# Patient Record
Sex: MALE | Race: WHITE
[De-identification: names, ages, dates, MRNs, and addresses within clinical notes are randomized per-mention and may not be internally consistent; named-entity substitution may affect disease eponyms.]

---

## 2017-11-06 NOTE — CT
EXAMINATION TYPE: CT urogram wo/w con

 

DATE OF EXAM: 11/6/2017

 

COMPARISON: 6/29/2015

 

HISTORY: Microscopic hematuria

 

CT DLP: 2401 mGycm, Automated Exposure Control for Dose Reduction was Utilized.

 

CONTRAST: 

CT scan of the abdomen and pelvis is performed with oral and without and with IV Contrast, patient in
jected with 100 mL of Omnipaque 300.

 

FINDINGS:

 

LUNG BASES: Focal pleural thickening measuring 5 mm versus subpleural pulmonary nodule is seen along 
the anterior right lower lobe.

 

LIVER/GB:   No significant abnormality is appreciated. No radiopaque calculi within the gallbladder.

 

PANCREAS:  No significant abnormality is seen.

 

SPLEEN:  No significant abnormality is seen.

 

ADRENALS:  No significant abnormality is seen.

 

KIDNEYS: 7 mm calculus of the posterior right inferior pole is seen within a distal calyx, this is un
changed from the prior exam. Left lower pole renal sinus cyst is also seen measuring 1.9 cm. No evide
nce of obstructive uropathy. No evidence of nephrolithiasis.

 

Right upper pole probable cyst is 4 mm and too small to accurately characterize.

 

BOWEL:   No significant abnormality is seen.

 

PROSTATE/SEMINAL VESICLES: Heterogenous containing central zone calcifications and enlarged measuring
 approximately 5.4 cm in transverse dimension. Left fat filled inguinal hernias noted.

 

LYMPH NODES:  No greater than 1cm abdominal or pelvic lymph nodes are appreciated.

 

OSSEOUS STRUCTURES:  No significant abnormality is seen.

 

IMPRESSION:

1. Unchanged nonobstructing 7 mm right lower pole calculus within a peripheral minor calyx. No eviden
ce of obstructive uropathy bilaterally.

2. Heterogenous and enlarged prostate gland containing central zone calcifications.

3. Left 1.9 cm renal sinus cyst.

4. Probable right upper pole renal cyst although this is 4 mm and too small to accurately characteriz
e.

## 2018-02-05 NOTE — P.OP
Date of Procedure: 02/05/18


Preoperative Diagnosis: 


Screening colonoscopy


Postoperative Diagnosis: 


Mild diverticulosis


Procedure(s) Performed: 


Colonoscopy


Anesthesia: MAC


Surgeon: Felipe Kuhn


Pathology: none sent


Condition: stable


Disposition: PACU


Description of Procedure: 


The patient's placed on the endoscopy table in the lateral position.  She 

received IV sedation.  Digital rectal exam was performed which revealed no 

ebonized.  The prostate was symmetric without nodules.  The flexible 

colonoscope was then placed patient anus passed throughout the entire colon.  

The ileocecal valve was visually is.  The cecum, ascending and transverse colon 

appeared normal.  The descending and sigmoid had mild diverticulosis.  There is 

known to diverticulitis.  Scope was then brought back the rectum and this 

appeared normal.  Scope withdrawn for patient.

## 2018-02-05 NOTE — P.GSHP
History of Present Illness


H&P Date: 02/05/18


Chief Complaint: Screening colonoscopy





This is a 62-year-old male referred from Dr. Johnson.  Patient presents today 

for screening colonoscopy.  He denies a significant GI complaints.





Past Medical History


Past Medical History: CVA/TIA, GERD/Reflux, Hyperlipidemia, Hypertension


Additional Past Medical History / Comment(s): no deficits from CVA


History of Any Multi-Drug Resistant Organisms: None Reported


Additional Past Surgical History / Comment(s): dental implants


Past Anesthesia/Blood Transfusion Reactions: No Reported Reaction


Smoking Status: Never smoker





- Past Family History


  ** Brother(s)


Family Medical History: Cancer


Additional Family Medical History / Comment(s): colon/bladder





Medications and Allergies


 Home Medications











 Medication  Instructions  Recorded  Confirmed  Type


 


Aspirin EC [Ecotrin] 325 mg PO DAILY 02/01/18 02/05/18 History


 


Hydrochlorothiazide 25 mg PO DAILY 02/01/18 02/05/18 History





[Hydrochlorothiazide]    


 


Ranitidine HCl [Zantac] 150 mg PO DAILY 02/01/18 02/05/18 History


 


Simvastatin [Simvastatin] 40 mg PO DAILY 02/01/18 02/05/18 History











 Allergies











Allergy/AdvReac Type Severity Reaction Status Date / Time


 


No Known Allergies Allergy   Verified 02/01/18 10:00














Surgical - Exam


 Vital Signs











Temp Pulse Resp BP


 


 98.8 F   74   16   140/85 


 


 02/05/18 08:13  02/05/18 08:13  02/05/18 08:13  02/05/18 08:13














- General


well developed, no distress





- Eyes


PERRL





- ENT


normal pinna





- Neck


no masses





- Respiratory


normal expansion





- Cardiovascular


Rhythm: regular





- Abdomen


Abdomen: soft, non tender





Assessment and Plan


Assessment: 





We'll perform screening colonoscopy.

## 2021-07-06 ENCOUNTER — HOSPITAL ENCOUNTER (OUTPATIENT)
Age: 66
Discharge: HOME | End: 2021-07-06
Payer: MEDICARE

## 2021-07-06 DIAGNOSIS — Z01.812: Primary | ICD-10-CM

## 2021-07-06 PROCEDURE — 86850 RBC ANTIBODY SCREEN: CPT

## 2021-07-06 PROCEDURE — 80053 COMPREHEN METABOLIC PANEL: CPT

## 2021-07-06 PROCEDURE — 87070 CULTURE OTHR SPECIMN AEROBIC: CPT

## 2021-07-06 PROCEDURE — 85025 COMPLETE CBC W/AUTO DIFF WBC: CPT

## 2021-07-06 PROCEDURE — 81001 URINALYSIS AUTO W/SCOPE: CPT

## 2021-07-06 PROCEDURE — 86901 BLOOD TYPING SEROLOGIC RH(D): CPT

## 2021-07-06 PROCEDURE — 85730 THROMBOPLASTIN TIME PARTIAL: CPT

## 2021-07-06 PROCEDURE — 85610 PROTHROMBIN TIME: CPT

## 2021-07-06 PROCEDURE — 86900 BLOOD TYPING SEROLOGIC ABO: CPT

## 2021-07-06 PROCEDURE — 36415 COLL VENOUS BLD VENIPUNCTURE: CPT

## 2021-07-13 ENCOUNTER — HOSPITAL ENCOUNTER (OUTPATIENT)
Dept: HOSPITAL 47 - OR | Age: 66
LOS: 1 days | Discharge: HOME HEALTH | End: 2021-07-14
Attending: ORTHOPAEDIC SURGERY
Payer: MEDICARE

## 2021-07-13 VITALS — BODY MASS INDEX: 26.1 KG/M2

## 2021-07-13 DIAGNOSIS — M16.11: Primary | ICD-10-CM

## 2021-07-13 DIAGNOSIS — K21.9: ICD-10-CM

## 2021-07-13 DIAGNOSIS — I10: ICD-10-CM

## 2021-07-13 DIAGNOSIS — Z79.899: ICD-10-CM

## 2021-07-13 DIAGNOSIS — I69.398: ICD-10-CM

## 2021-07-13 DIAGNOSIS — E78.5: ICD-10-CM

## 2021-07-13 DIAGNOSIS — Z79.82: ICD-10-CM

## 2021-07-13 DIAGNOSIS — Z79.1: ICD-10-CM

## 2021-07-13 PROCEDURE — 85025 COMPLETE CBC W/AUTO DIFF WBC: CPT

## 2021-07-13 PROCEDURE — 97116 GAIT TRAINING THERAPY: CPT

## 2021-07-13 PROCEDURE — 97110 THERAPEUTIC EXERCISES: CPT

## 2021-07-13 PROCEDURE — 97165 OT EVAL LOW COMPLEX 30 MIN: CPT

## 2021-07-13 PROCEDURE — 86891 AUTOLOGOUS BLOOD OP SALVAGE: CPT

## 2021-07-13 PROCEDURE — 27130 TOTAL HIP ARTHROPLASTY: CPT

## 2021-07-13 PROCEDURE — 86901 BLOOD TYPING SEROLOGIC RH(D): CPT

## 2021-07-13 PROCEDURE — 86850 RBC ANTIBODY SCREEN: CPT

## 2021-07-13 PROCEDURE — 73501 X-RAY EXAM HIP UNI 1 VIEW: CPT

## 2021-07-13 PROCEDURE — 86900 BLOOD TYPING SEROLOGIC ABO: CPT

## 2021-07-13 PROCEDURE — 97161 PT EVAL LOW COMPLEX 20 MIN: CPT

## 2021-07-13 PROCEDURE — 88300 SURGICAL PATH GROSS: CPT

## 2021-07-13 RX ADMIN — Medication PRN ML: at 09:36

## 2021-07-13 RX ADMIN — HYDROMORPHONE HYDROCHLORIDE PRN MG: 1 INJECTION, SOLUTION INTRAMUSCULAR; INTRAVENOUS; SUBCUTANEOUS at 11:23

## 2021-07-13 RX ADMIN — DEXAMETHASONE SODIUM PHOSPHATE ONE: 4 INJECTION, SOLUTION INTRAMUSCULAR; INTRAVENOUS at 16:28

## 2021-07-13 RX ADMIN — CEFAZOLIN SCH: 330 INJECTION, POWDER, FOR SOLUTION INTRAMUSCULAR; INTRAVENOUS at 16:28

## 2021-07-13 RX ADMIN — ASPIRIN 325 MG ORAL TABLET SCH MG: 325 PILL ORAL at 19:19

## 2021-07-13 RX ADMIN — POTASSIUM CHLORIDE SCH MLS: 14.9 INJECTION, SOLUTION INTRAVENOUS at 09:05

## 2021-07-13 RX ADMIN — HYDROMORPHONE HYDROCHLORIDE PRN MG: 1 INJECTION, SOLUTION INTRAMUSCULAR; INTRAVENOUS; SUBCUTANEOUS at 10:56

## 2021-07-13 RX ADMIN — HYDROCODONE BITARTRATE AND ACETAMINOPHEN PRN EACH: 7.5; 325 TABLET ORAL at 19:19

## 2021-07-13 RX ADMIN — ONDANSETRON ONE MG: 2 INJECTION INTRAMUSCULAR; INTRAVENOUS at 08:56

## 2021-07-13 RX ADMIN — HYDROMORPHONE HYDROCHLORIDE PRN MG: 1 INJECTION, SOLUTION INTRAMUSCULAR; INTRAVENOUS; SUBCUTANEOUS at 11:00

## 2021-07-13 RX ADMIN — CEFAZOLIN SCH MLS/HR: 330 INJECTION, POWDER, FOR SOLUTION INTRAMUSCULAR; INTRAVENOUS at 23:30

## 2021-07-13 RX ADMIN — DEXAMETHASONE SODIUM PHOSPHATE ONE MG: 4 INJECTION, SOLUTION INTRAMUSCULAR; INTRAVENOUS at 08:56

## 2021-07-13 RX ADMIN — Medication PRN ML: at 10:20

## 2021-07-13 RX ADMIN — POTASSIUM CHLORIDE SCH MLS: 14.9 INJECTION, SOLUTION INTRAVENOUS at 08:45

## 2021-07-13 RX ADMIN — ONDANSETRON ONE: 2 INJECTION INTRAMUSCULAR; INTRAVENOUS at 16:28

## 2021-07-13 RX ADMIN — HYDROMORPHONE HYDROCHLORIDE PRN MG: 1 INJECTION, SOLUTION INTRAMUSCULAR; INTRAVENOUS; SUBCUTANEOUS at 11:20

## 2021-07-13 NOTE — P.OP
Date of Procedure: 07/13/21


Preoperative Diagnosis: 


severe osteoarthritis of the right hip


Postoperative Diagnosis: 


severe osteoarthritis of the right hip


Procedure(s) Performed: 


right total hip arthroplasty with a direct anterior approach


Implants: 


[employer] Polarstem standard size 6


[employer] R3, 3 hole hemispherical acetabular shell, 52 mm


[employer] Reflection 6.5 mm cancellus screw, 20 mm 2


[employer] R3, XLPE 20 acetabular liner 


[employer]hew Oxinium femoral head 36 m, +0


All components were press-fit.


The articulation is Oxinium on polyethylene.


Anesthesia: GETA


Surgeon: Keshawn Ng


Assistant #1: Juli Bustamante


Estimated Blood Loss (ml): 400 (141 mL returned with Cell Saver)


Pathology: other (femoral head)


Condition: stable


Disposition: PACU


Indications for Procedure: 


After failure of conservative treatment we discussed the surgical and 

nonsurgical treatment options at length.  Patient wishes to proceed with a total

hip arthroplasty with a direct anterior approach.  Complications specific to 

this procedure were discussed at length, including but not limited to infection,

leg length discrepancy, dislocation, nerve injury, and fracture.  Covid-19 was 

also discussed at length with the patient, and they are aware of the current 

policies and procedures.  The patient was given the option of delaying surgery, 

but they elect to proceed knowing these risks.  Patient is aware of all these 

complications and informed consent was obtained


Operative Findings: 


the operative findings are consistent with severe osteoarthritis of the right 

hip


Description of Procedure: 


Patient was seen and evaluated in the preoperative area and the consent was 

reviewed.  The operative site was marked with a skin marker.  The patient was 

then brought to the operating room and given preoperative antibiotics 

intravenously.  1 g of Tranexamic acid was also given intravenously.  A general 

anesthetic was administered by the anesthesia department.   The patient was then

placed on the [place] table with the bony prominences well-padded.  The hip area 

was then prepped with a ChloraPrep solution and draped in the usual sterile 

fashion.





A universal timeout was then performed, which confirmed the patient's name, 

surgical site, ALLERGIES, and procedure being performed on the consent.  Next 

the incision site was located at 1 cm distal to the anterior superior iliac 

spine along the flexion crease of the hip.  The skin and subcutaneous tissues 

were sharply incised.  Incision was carefully dissected down to the fascia 

overlying the tensor fascia anayeli muscle.  This fascia was then incised in line 

with the incision.  Care was taken to stay laterally in order to avoid injuring 

the lateral femoral cutaneous nerve.  Next, using blunt finger dissection, the 

tensor fascia anayeli muscle was dissected off its investing fascia.  The muscle 

was then carefully retracted laterally with a cobra retractor over the lateral 

neck of the femur.  Next, the circumflex vessels were identified and cauterized 

using the AquaMantis device.  The anterior hip capsule was then exposed.  The 

capsule was then opened and an inverted T fashion.  Cobra retractors were then 

placed intracapsularly.  The retractors were maintained intracapsular throughout

the procedure.  The proximal femur was then visualized.  A small amount of 

traction was placed on the leg.





The femoral neck was then osteotomized appropriate level above the lesser 

trochanter.  A small wedge of bone was then removed from the remaining femoral 

head.  Next, using a corkscrew the femoral head was removed from the acetabulum.

 On gross visual inspection, the femoral head had complete loss of articular 

cartilage and multiple periarticular osteophytes.  The femoral head was then 

measured.  Attention was then turned to the acetabulum.





The acetabulum was exposed and any remaining labrum was excised.  Sequential 

reaming of the acetabulum was performed using fluoroscopic guidance until there 

was a good bed of bleeding cancellus bone.  When the appropriate size was 

reached, a trial was then placed.  The position and fit of the trial was checked

with fluoroscopy.  The trial was then removed.  Then, using fluoroscopic 

guidance, the final implant was impacted at 20 of anteversion and 40 of 

abduction, and fully seated in the acetabulum.  2 screws were then placed in the

acetabulum.  Again fluoroscopy was used to check position of the screws.  Next, 

the liner was then impacted, with a 20 elevated liner located in the anterior 

superior quadrant.  Component locking was confirmed.





Attention was then directed to the femur.  With the aid of the [place] table, the 

femur was externally rotated to approximately 130, extended, and adducted under

the opposite leg.  A side hook was then placed under the proximal femur, and the

side hook elevator was used to elevate the proximal femur while releasing the 

capsule.  Retractors were then placed.  A capsular release was performed, as 

well as a release of the conjoined tendon, which afforded excellent 

visualization of the proximal femur.  Next, a box osteotome was used to 

lateralize the proximal femur.  A  was then used to locate the 

femoral canal.  Sequential broaching was then performed with appropriate size 

which afforded excellent fixation in the proximal femur.  A trial was then 

placed with appropriate head and neck, and the hip was gently reduced with the 

aid of the [place] table.  Fluoroscopy was then used to check position of the 

components, as well as to ensure equal leg lengths.  The hip was then gently 

dislocated and the trials were then removed.  Final implants were then impacted 

and the hip was again reduced.  Final fluoroscopic x-rays confirmed that the 

components were in anatomic position, as well as equal leg lengths.  The hip was

also taken through range of motion, and found to be stable.





The hip was then copiously irrigated with antibiotic solution with pulsatile 

lavage.  The hip was then irrigated with Irrisept solution.  The soft tissues 

were then injected with a ropivacaine solution, which consisted of 246.25 mg of 

ropivacaine, 0.5 mg of epinephrine, 30 mg of Toradol, 80 g of clonidine, and 

48.45 mL of sterile water, for a total of 100 mL of fluid injected.  A second 

dose of 1 g of Tranexamic acid was also given intravenously.  Any blood 

collected by Cell Saver was then returned to the patient at this time.





The fascia was then closed with 2-0 strata fix suture.  The subcutaneous tissue 

was closed with 3-0 Vicryl.  The subcuticular tissue was closed with 3-0 strata 

fix suture.  The skin was then closed with Exofin skin glue.  After the glue and

dried, and Optifoam silver impregnated dressing was applied.  The patient was 

then transferred to the recovery room in stable condition.





The assistant  REYES Lipscomb was required due to the complexity of surgery, 

and the need for skilled surgical assistant for positioning, draping, exposure, 

retraction, and closure of the wound.

## 2021-07-13 NOTE — P.CONS
History of Present Illness





- Reason for Consult


Consult date: 07/13/21





- History of Present Illness





History of Presenting Illness:





Patient is a 65-year-old male with a past medical history of hypertension, 

hyperlipidemia, CVA with left-sided deficit with decreased sensation and 

osteoarthritis.  He is currently admitted under orthopedic surgery team status 

post elective right total hip replacement due to advanced osteoarthritis.  We 

have been consulted to follow along with this patient throughout his 

hospitalization for continued medical management.  Patient is currently 

postoperative period and reports having postoperative nausea and vomiting.  

Scopolamine patch in place.  Patient given Zofran.  Continue hydrated with 

gentle hydration with IV fluids and we will advance diet as patient tolerates.  

Patient currently denies having any headache, lightheadedness, dizziness, chest 

pain, palpitations, shortness of breath, or experiencing any new or worsened 

numbness/tingling/weakness in extremities.  Patient does have left-sided 

decreased sensation from previous CVA in 2013 in which patient takes aspirin and

atorvastatin daily.  Patient denies history of DVT or PE.  Reports postoperative

pain manageable at this time.





Review of systems:





Pertinent positives and negatives as discussed in HPI, a complete review of 

systems was performed and all other systems are negative.





Physical exam:





General: non toxic, no distress, appears at stated age


Derm: warm, dry


Head: atraumatic, normocephalic, symmetric


Eyes: EOMI, no lid lag, anicteric sclera


Mouth: no lip lesion, mucus membranes moist


Cardiovascular: S1-S2 normal with regular rate and rhythm.  No murmurs, gallops,

or rubs noted.  Posterior tibial pulses palpated bilaterally.  Cap refill less 

than 2 seconds.


Lungs: Respirations even, regular, and unlabored on room air.  Lungs clear to 

auscultation bilaterally with no wheezes, rhonchi, or rales noted.  No accessory

muscle usage.


Abdominal: soft, nontender to palpation, no guarding, no appreciable 

organomegaly


Ext: no gross muscle atrophy, no edema, no contractures


Neuro: GCS 15.  Speech clear.  CN II-XI grossly intact, no focal neuro deficits


Psych: Alert, oriented, appropriate affect 





Assessment and Plan of Care:





Status post right hip replacement secondary to advanced osteoarthritis


-Pain management, DVT prophylaxis, weightbearing, PT/OT, and dressing changes 

per primary admitting orthopedic surgery team.


-DVT prophylaxis currently with aspirin, TAL hose, and SCDs





Postoperative nausea and vomiting


-Zofran as needed for nausea and vomiting


-Gentle hydration with IV fluids and advance diet as patient tolerates.





Hypertension


-Monitor vital signs and continue daily medication regimen with 

hydrochlorothiazide.





Hyperlipidemia


-Continue daily medication regimen with atorvastatin.


-Heart healthy diet.





History with left-sided residual deficit of decreased sensation


-Safe and supportive care with assistance as needed


-Fall precautions.


-Continue aspirin and atorvastatin.





Thank you for allowing us to participate in the care of this pleasant patient.  

Do not hesitate to contact us with questions.  Someone can be reached from the 

[facility] hospitalist group all hours of the day at [phone] or via 

perfect serve.











Past Medical History


Past Medical History: CVA/TIA, GERD/Reflux, Hyperlipidemia, Hypertension


Additional Past Medical History / Comment(s): no deficits from CVA


History of Any Multi-Drug Resistant Organisms: None Reported


Past Surgical History: Orthopedic Surgery


Additional Past Surgical History / Comment(s): dental implants, wisdom teeth, 

colonoscopy


Past Anesthesia/Blood Transfusion Reactions: Postoperative Nausea & Vomiting 

(PONV)


Past Psychological History: No Psychological Hx Reported


Smoking Status: Never smoker


Past Alcohol Use History: None Reported


Past Drug Use History: None Reported





- Past Family History


  ** Brother(s)


Family Medical History: Cancer


Additional Family Medical History / Comment(s): colon/bladder





Medications and Allergies


                                Home Medications











 Medication  Instructions  Recorded  Confirmed  Type


 


Aspirin EC [Ecotrin] 325 mg PO DAILY 02/01/18 07/13/21 History


 


Simvastatin 40 mg PO HS 02/01/18 07/13/21 History


 


hydroCHLOROthiazide 25 mg PO HS 02/01/18 07/13/21 History





[Hydrochlorothiazide]    


 


Acetaminophen Tab [Tylenol] 650 mg PO Q6H PRN 07/09/21 07/13/21 History


 


Ibuprofen 200 - 600 mg PO Q6H PRN 07/09/21 07/13/21 History


 


Aspirin 325 mg PO BID #60 tab 07/13/21  Rx


 


Celecoxib [CeleBREX] 200 mg PO DAILY 5 Days #5 capsule 07/13/21  Rx


 


HYDROcodone/APAP 7.5-325MG [Norco 1 - 2 tab PO Q6H PRN #32 tab 07/13/21  Rx





7.5-325]    


 


Ondansetron Odt [Zofran Odt] 1 tab PO Q8HR PRN #10 tab 07/13/21  Rx


 


Sennosides [Senokot] 2 tab PO DAILY PRN #60 tablet 07/13/21  Rx








                                    Allergies











Allergy/AdvReac Type Severity Reaction Status Date / Time


 


No Known Allergies Allergy   Verified 07/13/21 08:36














Physical Exam


Vitals: 


                                   Vital Signs











  Temp Pulse Pulse Pulse Resp BP BP


 


 07/13/21 15:50  97.5 F L   58 L   18   132/77


 


 07/13/21 15:42    60   20   144/80


 


 07/13/21 15:08    67   20   126/79


 


 07/13/21 14:59    71   20   121/78


 


 07/13/21 14:41    68   18   114/76


 


 07/13/21 14:30    75   20   123/72


 


 07/13/21 14:00    56 L   18  132/85 


 


 07/13/21 13:30    58 L   20  134/87 


 


 07/13/21 13:00    58 L   20  134/70 


 


 07/13/21 12:45    57 L   18  140/84 


 


 07/13/21 12:30    58 L   20  


 


 07/13/21 12:19    61   20  133/83 


 


 07/13/21 11:54    67   18  151/87 


 


 07/13/21 11:33    78   20  156/78 


 


 07/13/21 11:16   57 L    16  151/82 


 


 07/13/21 11:02   58 L    16  151/85 


 


 07/13/21 10:48   56 L    16  170/93 


 


 07/13/21 10:37  97.0 F L  64    12  171/89 


 


 07/13/21 08:45  98.4 F    87  18  141/83 














  Pulse Ox


 


 07/13/21 15:50  96


 


 07/13/21 15:42  97


 


 07/13/21 15:08  97


 


 07/13/21 14:59  98


 


 07/13/21 14:41  96


 


 07/13/21 14:30  95


 


 07/13/21 14:00  97


 


 07/13/21 13:30  97


 


 07/13/21 13:00  97


 


 07/13/21 12:45  97


 


 07/13/21 12:30 


 


 07/13/21 12:19  97


 


 07/13/21 11:54  95


 


 07/13/21 11:33  98


 


 07/13/21 11:16  94 L


 


 07/13/21 11:02  100


 


 07/13/21 10:48  100


 


 07/13/21 10:37  97


 


 07/13/21 08:45  99








                                Intake and Output











 07/13/21 07/13/21 07/13/21





 06:59 14:59 22:59


 


Intake Total  2076 


 


Output Total  400 


 


Balance  1676 


 


Intake:   


 


  IV  2076 


 


Output:   


 


  Estimated Blood Loss  400 


 


Other:   


 


  Weight  79.4 kg 79.4 kg

## 2021-07-13 NOTE — FL
Fluoroscopy

 

HISTORY: Hip arthroplasty

 

16 seconds fluoroscopy time supplied to the referring clinician.  2 intraoperative C-arm images docum
ent the procedure. See dictated report from orthopedic surgery.

## 2021-07-13 NOTE — XR
EXAMINATION TYPE: XR Hip Limited RT

 

DATE OF EXAM: 7/13/2021

 

CLINICAL HISTORY: Right hip pain and osteoarthritis.

 

TECHNIQUE: Single AP portable view of right hip is obtained immediately postoperatively.  

 

COMPARISON: CT 6/29/2015

 

FINDINGS: Metallic hardware from right hip arthroplasty is seen and appears satisfactory in alignment
 and position.  There is evidence of recent surgery with subcutaneous gas noted laterally.  

 

IMPRESSION: Metallic hardware from right hip arthroplasty is satisfactory in position.

## 2021-07-13 NOTE — XR
Single view right hip paper image

 

INDICATION: Postoperative right hip

 

COMPARISON: 6/29/2015

 

FINDINGS:

Patient is status post right hip replacement with 2 acetabular screws. For full details please see th
e operative report.

 

IMPRESSION:

Patient is status post right hip replacement with 2 acetabular screws. For full details please see th
e operative report.

## 2021-07-14 VITALS — TEMPERATURE: 98.9 F

## 2021-07-14 VITALS — HEART RATE: 74 BPM | SYSTOLIC BLOOD PRESSURE: 111 MMHG | DIASTOLIC BLOOD PRESSURE: 59 MMHG | RESPIRATION RATE: 17 BRPM

## 2021-07-14 LAB
BASOPHILS # BLD AUTO: 0 K/UL (ref 0–0.2)
BASOPHILS NFR BLD AUTO: 0 %
EOSINOPHIL # BLD AUTO: 0 K/UL (ref 0–0.7)
EOSINOPHIL NFR BLD AUTO: 0 %
ERYTHROCYTE [DISTWIDTH] IN BLOOD BY AUTOMATED COUNT: 3.62 M/UL (ref 4.3–5.9)
ERYTHROCYTE [DISTWIDTH] IN BLOOD: 12.4 % (ref 11.5–15.5)
HCT VFR BLD AUTO: 33.9 % (ref 39–53)
HGB BLD-MCNC: 11.6 GM/DL (ref 13–17.5)
LYMPHOCYTES # SPEC AUTO: 1.5 K/UL (ref 1–4.8)
LYMPHOCYTES NFR SPEC AUTO: 15 %
MCH RBC QN AUTO: 32.1 PG (ref 25–35)
MCHC RBC AUTO-ENTMCNC: 34.3 G/DL (ref 31–37)
MCV RBC AUTO: 93.7 FL (ref 80–100)
MONOCYTES # BLD AUTO: 0.7 K/UL (ref 0–1)
MONOCYTES NFR BLD AUTO: 8 %
NEUTROPHILS # BLD AUTO: 7.4 K/UL (ref 1.3–7.7)
NEUTROPHILS NFR BLD AUTO: 76 %
PLATELET # BLD AUTO: 135 K/UL (ref 150–450)
WBC # BLD AUTO: 9.7 K/UL (ref 3.8–10.6)

## 2021-07-14 RX ADMIN — HYDROCODONE BITARTRATE AND ACETAMINOPHEN PRN EACH: 7.5; 325 TABLET ORAL at 06:08

## 2021-07-14 RX ADMIN — ASPIRIN 325 MG ORAL TABLET SCH MG: 325 PILL ORAL at 08:33

## 2021-07-14 NOTE — P.DS
Providers


Date of admission: 


07/13/2021





Expected date of discharge: 07/14/21


Attending physician: 


Keshawn Ng





Consults: 





                                        





07/13/21 16:18


Consult Physician Routine 


   Consulting Provider: Jonathan Choe


   Consult Reason/Comments: medical management


   Do you want consulting provider notified?: Yes











Primary care physician: 


Sujit Johnson








- Discharge Diagnosis(es)


(1) Osteoarthritis of right hip


Current Visit: Yes   Status: Acute   





(2) Status post total hip replacement, right


Current Visit: Yes   Status: Acute   


Hospital Course: 


This is a 65-year-old male with known history of degenerative arthritis of the 

right hip.  The patient presents for evaluation.  After discussion and 

consideration patient elects to proceed with total  hip arthroplasty with direct

anterior approach.


The patient is seen preoperatively by his primary care physician and cleared for

surgery.





Patient is admitted to [facility] on 07/13/2021 for total hip 

arthroplasty with direct anterior approach.  The procedures performed without 

complication or sequelae.  The patient is doing well postoperatively.  Labs and 

vital signs are stable on day of discharge.





On day of discharge patient's hip incision is healing well.  There is minimal 

erythema.  There is no drainage noted at this time.  There is minimal soft 

tissue swelling to the hip and thigh.  Patient has full foot and ankle motion 

without difficulty or pain.  Neurovascular status to the right lower extremity 

is intact.





Patient is discharged to home in good condition.  Please see med rec for 

accurate list of home medications.





Patient Condition at Discharge: Good





Plan - Discharge Summary


Discharge Rx Participant: Yes


New Discharge Prescriptions: 


New


   Aspirin 325 mg PO BID #60 tab


   Celecoxib [CeleBREX] 200 mg PO DAILY 5 Days #5 capsule


   HYDROcodone/APAP 7.5-325MG [Norco 7.5-325] 1 - 2 tab PO Q6H PRN #32 tab


     PRN Reason: Pain


   Sennosides [Senokot] 2 tab PO DAILY PRN #60 tablet


     PRN Reason: Constipation


   Ondansetron Odt [Zofran Odt] 1 tab PO Q8HR PRN #10 tab


     PRN Reason: Nausea





No Action


   Aspirin EC [Ecotrin] 325 mg PO DAILY


   Simvastatin 40 mg PO HS


   hydroCHLOROthiazide [Hydrochlorothiazide] 25 mg PO HS


   Acetaminophen Tab [Tylenol] 650 mg PO Q6H PRN


     PRN Reason: Pain


   Ibuprofen 200 - 600 mg PO Q6H PRN


     PRN Reason: Pain


Discharge Medication List





Aspirin EC [Ecotrin] 325 mg PO DAILY 02/01/18 [History]


Simvastatin 40 mg PO HS 02/01/18 [History]


hydroCHLOROthiazide [Hydrochlorothiazide] 25 mg PO HS 02/01/18 [History]


Acetaminophen Tab [Tylenol] 650 mg PO Q6H PRN 07/09/21 [History]


Ibuprofen 200 - 600 mg PO Q6H PRN 07/09/21 [History]


Aspirin 325 mg PO BID #60 tab 07/13/21 [Rx]


Celecoxib [CeleBREX] 200 mg PO DAILY 5 Days #5 capsule 07/13/21 [Rx]


HYDROcodone/APAP 7.5-325MG [Norco 7.5-325] 1 - 2 tab PO Q6H PRN #32 tab 07/13/21

[Rx]


Ondansetron Odt [Zofran Odt] 1 tab PO Q8HR PRN #10 tab 07/13/21 [Rx]


Sennosides [Senokot] 2 tab PO DAILY PRN #60 tablet 07/13/21 [Rx]








Follow up Appointment(s)/Referral(s): 


[facility], [NON-STAFF] - 


Keshawn Ng DO [Doctor of Osteopathic Medicine] - 07/26/21 9:45 am


Patient Instructions/Handouts:  *[facility] - (Anesthesia) Discharge 

Instructions Outpatient Surgery, *[facility] - Scopalamine Patch Instructions, 

How to Use an Incentive Spirometer (DC), Total Hip Replacement (DC)


Activity/Diet/Wound Care/Special Instructions: 


Weightbearing as tolerated with walker.


Leave dressing intact.  Dressing may be removed by home care nurse or by patient

in 7 days. Then change dressing twice daily until follow up.


May shower with initial dressing intact and after removal.


If dressing become saturated, please remove.


Please take aspirin 325mg twice daily for 30 days to prevent blood clots.


Recommend use of compression stockings daily until follow up to help prevent 

swelling and blood clots. May remove at night before sleeping. 


Please follow-up with [facility] in 2 weeks and call with any 

questions or concerns, [phone].





Discharge Disposition: HOME WITH HOME HEALTH SERVICES

## 2021-07-14 NOTE — P.PN
Subjective


Progress Note Date: 07/14/21





Hospital course:





Patient is a 65-year-old male with a past medical history of hypertension, 

hyperlipidemia, CVA with left-sided deficit with decreased sensation and 

osteoarthritis.  He is currently admitted under orthopedic surgery team status 

post elective right total hip replacement due to advanced osteoarthritis.  We 

have been consulted to follow along with this patient throughout his 

hospitalization for continued medical management.  Patient is currently day 1 

postop and is doing well.  He has been ambulatory in room without difficulties. 

He states postoperative pain is managed and denies having any further episodes 

of nausea or vomiting.  Patient tolerating oral intake without any difficulties.

 He denies having any headache, lightheadedness, dizziness, chest pain, 

palpitations, shortness of breath, or experiencing any 

numbness/swelling/weakness in his extremities.





Physical exam:





General: non toxic, no distress, appears at stated age


Derm: warm, dry


Head: atraumatic, normocephalic, symmetric


Eyes: EOMI, no lid lag, anicteric sclera


Mouth: no lip lesion, mucus membranes moist


Cardiovascular: S1-S2 normal with regular rate and rhythm.  No murmurs, gallops,

or rubs noted.  Posterior tibial pulses palpated bilaterally.  Cap refill less 

than 2 seconds.


Lungs: Respirations even, regular, and unlabored on room air.  Lungs clear to 

auscultation bilaterally with no wheezes, rhonchi, or rales noted.  No accessory

muscle usage.


Abdominal: soft, nontender to palpation, no guarding, no appreciable org

anomegaly


Ext: no gross muscle atrophy, no edema, no contractures.  Postoperative dressing

intact.  No signs of bleeding or drainage.


Neuro: GCS 15.  Speech clear.  CN II-XI grossly intact, no focal neuro deficits


Psych: Alert, oriented, appropriate affect 





Assessment and Plan of Care:





Status post right hip replacement secondary to advanced osteoarthritis


-Pain management, DVT prophylaxis, weightbearing, PT/OT, and dressing changes 

per primary admitting orthopedic surgery team.


-DVT prophylaxis currently with aspirin, TAL hose, and SCDs





Postoperative nausea and vomiting, resolved





Hypertension


-Monitor vital signs and continue daily medication regimen with 

hydrochlorothiazide.





Hyperlipidemia


-Continue daily medication regimen with atorvastatin.


-Heart healthy diet.





History with left-sided residual deficit of decreased sensation


-Safe and supportive care with assistance as needed


-Fall precautions.


-Continue aspirin and atorvastatin.











Thank you for allowing us to participate in the care of this pleasant patient.  

Do not hesitate to contact us with questions.  Someone can be reached from the 

[facility] hospitalist group all hours of the day at [phone] or via 

perfect serve.








Objective





- Vital Signs


Vital signs: 


                                   Vital Signs











Temp  98.9 F   07/14/21 07:56


 


Pulse  74   07/14/21 07:56


 


Resp  17   07/14/21 07:56


 


BP  111/59   07/14/21 07:56


 


Pulse Ox  96   07/14/21 07:56








                                 Intake & Output











 07/13/21 07/14/21 07/14/21





 18:59 06:59 18:59


 


Intake Total 2076  


 


Output Total 400 550 


 


Balance 1676 -550 


 


Weight 79.4 kg  


 


Intake:   


 


  IV 2076  


 


Output:   


 


  Urine  550 


 


  Estimated Blood Loss 400  














- Labs


CBC & Chem 7: 


                                 07/14/21 05:39





Labs: 


                  Abnormal Lab Results - Last 24 Hours (Table)











  07/14/21 Range/Units





  05:39 


 


RBC  3.62 L  (4.30-5.90)  m/uL


 


Hgb  11.6 L  (13.0-17.5)  gm/dL


 


Hct  33.9 L  (39.0-53.0)  %


 


Plt Count  135 L  (150-450)  k/uL

## 2021-11-24 ENCOUNTER — HOSPITAL ENCOUNTER (OUTPATIENT)
Dept: HOSPITAL 47 - RADUSWWP | Age: 66
Discharge: HOME | End: 2021-11-24
Attending: FAMILY MEDICINE
Payer: MEDICARE

## 2021-11-24 DIAGNOSIS — N42.89: Primary | ICD-10-CM

## 2021-11-24 PROCEDURE — 76872 US TRANSRECTAL: CPT

## 2021-11-24 NOTE — US
EXAMINATION TYPE: US prostate transrectal

 

DATE OF EXAM: 11/24/2021

 

COMPARISON: NONE

 

CLINICAL HISTORY: R97.20 ELEV PROSTATE SPECIFIC ANTIGEN. recent UTI and was on antibiotics, PSA check
 1 week prior and has elevated from around 1.4 to 6.4. Some nocturia but good flow

 

This examination was performed using the transrectal probe. 

 

EXAM MEASUREMENTS:

 

Gland Size: 5.3 x 5.3 x 3.4

Volume: 49.7ml

Predicted PSA:  6.0

Actual PSA (if available):6.4

 

2 areas of focal hypoechoic echogenicity noted on the left

 

#1 = 0.8 x 1.1 x 0.5cm

 #2 = 0.9 x 0.9 x 0.4cm 

 

 

 

IMPRESSION:

Hypoechoic lesion left peripheral zone. Consider tissue diagnosis.

 

Predicted PSA = volume x 0.12 ng/ml

Calculated Volume = 0.5236 x L x W x H

## 2022-03-01 ENCOUNTER — HOSPITAL ENCOUNTER (OUTPATIENT)
Dept: HOSPITAL 47 - LABWHC1 | Age: 67
Discharge: HOME | End: 2022-03-01
Attending: UROLOGY
Payer: MEDICARE

## 2022-03-01 DIAGNOSIS — R97.20: Primary | ICD-10-CM

## 2022-03-01 PROCEDURE — 84153 ASSAY OF PSA TOTAL: CPT

## 2022-03-01 PROCEDURE — 36415 COLL VENOUS BLD VENIPUNCTURE: CPT

## 2022-03-18 ENCOUNTER — HOSPITAL ENCOUNTER (OUTPATIENT)
Dept: HOSPITAL 47 - RADCTMAIN | Age: 67
Discharge: HOME | End: 2022-03-18
Attending: UROLOGY
Payer: MEDICARE

## 2022-03-18 DIAGNOSIS — Z96.641: ICD-10-CM

## 2022-03-18 DIAGNOSIS — N20.0: Primary | ICD-10-CM

## 2022-03-18 DIAGNOSIS — N32.89: ICD-10-CM

## 2022-03-18 DIAGNOSIS — N40.0: ICD-10-CM

## 2022-03-18 PROCEDURE — 74176 CT ABD & PELVIS W/O CONTRAST: CPT

## 2022-03-18 NOTE — CT
EXAMINATION TYPE: CT abdomen pelvis wo con

 

DATE OF EXAM: 3/18/2022

 

COMPARISON: 11/6/2017 urogram

 

HISTORY: 66-year-old male R31.1, microscopic hematuria, N20.0, calculus of kidney

 

CT DLP: 760 mGycm. Automated exposure control for dose reduction was used.

 

TECHNIQUE: Contiguous axial scanning of the abdomen and pelvis without IV contrast. Coronal and sagit
amrik reconstructions performed. 

 

FINDINGS: 

Heart normal size without pericardial effusion. LAD and RCA coronary artery calcifications. Partially
 visualized, probably ectatic ascending aorta measuring at least 3.8 cm.

 

5 mm subpleural pulmonary nodule periphery of the right base, unchanged from 6/29/2015. Noncalcified 
pleural plaque posterior left lower lobe unchanged from 2015. No pleural effusion.

 

Suspect a tiny hernia.

 

Noncontrast appearance of the liver, gallbladder, adrenal glands, spleen, and pancreas show no gross 
abnormality. There is a 2.4 cm diverticulum of the second portion of the duodenum projecting along th
e pancreatic head region.

 

8 mm calculus posterior right kidney is unchanged. A couple parapelvic cysts in the left kidney measu
ring up to 7 mm are smaller than prior.

 

No hydronephrosis. No ureteral calculus seen.

 

Mild ectasia right common iliac artery up to 1.5 cm.

 

No dilated small bowel, free fluid, or free air. No mesenteric or retroperitoneal lymphadenopathy.

 

High riding cecum. Mild to moderate stool burden. Redundant sigmoid colon extending up into the right
 upper quadrant. No pericolonic inflammatory change.

 

Bladder urine distended. Small left inguinal hernia. Prostate gland is enlarged measuring 5.2 cm wide
, not significantly changed. Central prostatic calcifications and pelvic phleboliths noted. There is 
streak and beam hardening artifact from the patient's right hip arthroplasty limiting visualization o
f pelvic structures. No obvious lymphadenopathy seen though, again, assessment is limited. No abnorma
l fluid collection the pelvis.

 

Bones: Right hip total arthroplasty. Moderate degenerative change left hip. Facet arthropathy lower l
umbar spine. Dish lower thoracic spine.

 

 

IMPRESSION: 

1.  Unchanged 8 mm calculus posterior right kidney. No ureteral calculus or hydronephrosis seen.

2.  Prostatomegaly of 5.2 cm wide. Bladder is urine distended. Limited detailed assessment due to pro
minent metal hardware artifact relating to the patient's right hip replacement.

3.  Incidental high riding cecum and redundant sigmoid colon extending up into the right upper quadra
nt, likely normal variation.

## 2022-05-12 ENCOUNTER — HOSPITAL ENCOUNTER (OUTPATIENT)
Dept: HOSPITAL 47 - LABPAT | Age: 67
Discharge: HOME | End: 2022-05-12
Attending: SURGERY
Payer: MEDICARE

## 2022-05-12 DIAGNOSIS — Z53.9: Primary | ICD-10-CM
